# Patient Record
Sex: FEMALE | Race: WHITE | Employment: FULL TIME | URBAN - METROPOLITAN AREA
[De-identification: names, ages, dates, MRNs, and addresses within clinical notes are randomized per-mention and may not be internally consistent; named-entity substitution may affect disease eponyms.]

---

## 2018-06-13 ENCOUNTER — HOSPITAL ENCOUNTER (OUTPATIENT)
Dept: GENERAL RADIOLOGY | Age: 48
Discharge: HOME | End: 2018-06-15
Payer: COMMERCIAL

## 2018-06-13 ENCOUNTER — HOSPITAL ENCOUNTER (OUTPATIENT)
Age: 48
Discharge: HOME | End: 2018-06-15
Payer: COMMERCIAL

## 2018-06-13 ENCOUNTER — OFFICE VISIT (OUTPATIENT)
Dept: OBGYN | Age: 48
End: 2018-06-13
Payer: COMMERCIAL

## 2018-06-13 VITALS
SYSTOLIC BLOOD PRESSURE: 139 MMHG | BODY MASS INDEX: 36.68 KG/M2 | TEMPERATURE: 98.5 F | HEIGHT: 68 IN | RESPIRATION RATE: 14 BRPM | HEART RATE: 80 BPM | DIASTOLIC BLOOD PRESSURE: 65 MMHG | WEIGHT: 242 LBS

## 2018-06-13 DIAGNOSIS — Z12.31 ENCOUNTER FOR SCREENING MAMMOGRAM FOR BREAST CANCER: ICD-10-CM

## 2018-06-13 DIAGNOSIS — Z01.419 ENCNTR FOR GYN EXAM (GENERAL) (ROUTINE) W/O ABN FINDINGS: ICD-10-CM

## 2018-06-13 DIAGNOSIS — Z01.419 ENCNTR FOR GYN EXAM (GENERAL) (ROUTINE) W/O ABN FINDINGS: Primary | ICD-10-CM

## 2018-06-13 DIAGNOSIS — Z12.4 PAP SMEAR FOR CERVICAL CANCER SCREENING: ICD-10-CM

## 2018-06-13 PROCEDURE — 88175 CYTOPATH C/V AUTO FLUID REDO: CPT

## 2018-06-13 PROCEDURE — 99203 OFFICE O/P NEW LOW 30 MIN: CPT | Performed by: OBSTETRICS & GYNECOLOGY

## 2018-06-13 PROCEDURE — 99386 PREV VISIT NEW AGE 40-64: CPT | Performed by: OBSTETRICS & GYNECOLOGY

## 2018-06-13 PROCEDURE — 87624 HPV HI-RISK TYP POOLED RSLT: CPT

## 2018-06-13 PROCEDURE — 77063 BREAST TOMOSYNTHESIS BI: CPT

## 2018-06-13 PROCEDURE — P3000 SCREEN PAP BY TECH W MD SUPV: HCPCS | Performed by: OBSTETRICS & GYNECOLOGY

## 2018-06-20 LAB
CORRESPONDING PAP CASE #: NORMAL
HPV, HIGH RISK: NEGATIVE

## 2019-01-03 ENCOUNTER — HOSPITAL ENCOUNTER (OUTPATIENT)
Dept: GENERAL RADIOLOGY | Age: 49
Discharge: HOME | End: 2019-01-05
Payer: COMMERCIAL

## 2019-01-03 DIAGNOSIS — N63.0 LUMP OR MASS IN BREAST: ICD-10-CM

## 2019-01-03 DIAGNOSIS — N64.4 BREAST PAIN: ICD-10-CM

## 2019-01-03 PROCEDURE — G0279 TOMOSYNTHESIS, MAMMO: HCPCS

## 2019-01-03 PROCEDURE — 76642 ULTRASOUND BREAST LIMITED: CPT

## 2019-09-26 ENCOUNTER — OFFICE VISIT (OUTPATIENT)
Dept: PRIMARY CARE CLINIC | Age: 49
End: 2019-09-26
Payer: COMMERCIAL

## 2019-09-26 VITALS
DIASTOLIC BLOOD PRESSURE: 64 MMHG | RESPIRATION RATE: 16 BRPM | HEART RATE: 72 BPM | HEIGHT: 69 IN | SYSTOLIC BLOOD PRESSURE: 120 MMHG | BODY MASS INDEX: 35.4 KG/M2 | OXYGEN SATURATION: 98 % | WEIGHT: 239 LBS

## 2019-09-26 DIAGNOSIS — B00.1 COLD SORE: Primary | ICD-10-CM

## 2019-09-26 PROCEDURE — 99213 OFFICE O/P EST LOW 20 MIN: CPT | Performed by: NURSE PRACTITIONER

## 2019-09-26 RX ORDER — ACYCLOVIR 400 MG/1
400 TABLET ORAL 3 TIMES DAILY
Qty: 15 TABLET | Refills: 1 | Status: SHIPPED | OUTPATIENT
Start: 2019-09-26 | End: 2019-10-01

## 2019-09-26 ASSESSMENT — PATIENT HEALTH QUESTIONNAIRE - PHQ9
SUM OF ALL RESPONSES TO PHQ9 QUESTIONS 1 & 2: 0
1. LITTLE INTEREST OR PLEASURE IN DOING THINGS: 0
SUM OF ALL RESPONSES TO PHQ QUESTIONS 1-9: 0
SUM OF ALL RESPONSES TO PHQ QUESTIONS 1-9: 0
2. FEELING DOWN, DEPRESSED OR HOPELESS: 0

## 2019-09-26 ASSESSMENT — ENCOUNTER SYMPTOMS
VOMITING: 0
SINUS PAIN: 0
SHORTNESS OF BREATH: 0
DIARRHEA: 0
CONSTIPATION: 0
WHEEZING: 0
NAUSEA: 0
COUGH: 0

## 2019-11-15 ENCOUNTER — HOSPITAL ENCOUNTER (OUTPATIENT)
Dept: ULTRASOUND IMAGING | Age: 49
Discharge: HOME | End: 2019-11-15
Payer: COMMERCIAL

## 2019-11-15 DIAGNOSIS — N92.1 MENORRHAGIA WITH IRREGULAR CYCLE: ICD-10-CM

## 2019-11-15 PROCEDURE — 76830 TRANSVAGINAL US NON-OB: CPT

## 2019-11-26 ENCOUNTER — OFFICE VISIT (OUTPATIENT)
Dept: OBGYN | Age: 49
End: 2019-11-26
Payer: COMMERCIAL

## 2019-11-26 ENCOUNTER — HOSPITAL ENCOUNTER (OUTPATIENT)
Age: 49
Discharge: HOME | End: 2019-11-28
Payer: COMMERCIAL

## 2019-11-26 ENCOUNTER — HOSPITAL ENCOUNTER (OUTPATIENT)
Age: 49
Discharge: HOME | End: 2019-11-26
Payer: COMMERCIAL

## 2019-11-26 VITALS
DIASTOLIC BLOOD PRESSURE: 72 MMHG | BODY MASS INDEX: 33.95 KG/M2 | HEIGHT: 68 IN | WEIGHT: 224 LBS | SYSTOLIC BLOOD PRESSURE: 127 MMHG | HEART RATE: 79 BPM

## 2019-11-26 DIAGNOSIS — N93.9 ABNORMAL UTERINE BLEEDING: Primary | ICD-10-CM

## 2019-11-26 DIAGNOSIS — N93.9 ABNORMAL UTERINE BLEEDING: ICD-10-CM

## 2019-11-26 DIAGNOSIS — R93.89 ENDOMETRIAL THICKENING ON ULTRASOUND: ICD-10-CM

## 2019-11-26 LAB
CONTROL: NORMAL
HCT VFR BLD CALC: 37.5 % (ref 34–48)
HEMOGLOBIN: 11.7 G/DL (ref 11.5–15.5)
MCH RBC QN AUTO: 28.5 PG (ref 26–35)
MCHC RBC AUTO-ENTMCNC: 31.2 % (ref 32–34.5)
MCV RBC AUTO: 91.2 FL (ref 80–99.9)
PDW BLD-RTO: 13.2 FL (ref 11.5–15)
PLATELET # BLD: 381 E9/L (ref 130–450)
PMV BLD AUTO: 10.6 FL (ref 7–12)
PREGNANCY TEST URINE, POC: NEGATIVE
PROLACTIN: 7.03 NG/ML
RBC # BLD: 4.11 E12/L (ref 3.5–5.5)
TSH SERPL DL<=0.05 MIU/L-ACNC: 0.87 UIU/ML (ref 0.27–4.2)
WBC # BLD: 4.9 E9/L (ref 4.5–11.5)

## 2019-11-26 PROCEDURE — 36415 COLL VENOUS BLD VENIPUNCTURE: CPT

## 2019-11-26 PROCEDURE — 84146 ASSAY OF PROLACTIN: CPT

## 2019-11-26 PROCEDURE — 81025 URINE PREGNANCY TEST: CPT | Performed by: OBSTETRICS & GYNECOLOGY

## 2019-11-26 PROCEDURE — 84443 ASSAY THYROID STIM HORMONE: CPT

## 2019-11-26 PROCEDURE — 99213 OFFICE O/P EST LOW 20 MIN: CPT | Performed by: OBSTETRICS & GYNECOLOGY

## 2019-11-26 PROCEDURE — 58100 BIOPSY OF UTERUS LINING: CPT | Performed by: OBSTETRICS & GYNECOLOGY

## 2019-11-26 PROCEDURE — 85027 COMPLETE CBC AUTOMATED: CPT

## 2019-11-26 PROCEDURE — 88305 TISSUE EXAM BY PATHOLOGIST: CPT

## 2019-12-03 ENCOUNTER — OFFICE VISIT (OUTPATIENT)
Dept: OBGYN | Age: 49
End: 2019-12-03
Payer: COMMERCIAL

## 2019-12-03 VITALS — HEART RATE: 83 BPM | DIASTOLIC BLOOD PRESSURE: 57 MMHG | SYSTOLIC BLOOD PRESSURE: 128 MMHG

## 2019-12-03 DIAGNOSIS — N93.9 ABNORMAL UTERINE BLEEDING: Primary | ICD-10-CM

## 2019-12-03 DIAGNOSIS — N63.10 LUMP OF RIGHT BREAST: ICD-10-CM

## 2019-12-03 PROCEDURE — 99213 OFFICE O/P EST LOW 20 MIN: CPT | Performed by: OBSTETRICS & GYNECOLOGY

## 2019-12-03 PROCEDURE — 99212 OFFICE O/P EST SF 10 MIN: CPT | Performed by: OBSTETRICS & GYNECOLOGY

## 2019-12-03 RX ORDER — FERROUS SULFATE 325(65) MG
325 TABLET ORAL
COMMUNITY
End: 2020-10-01

## 2019-12-03 RX ORDER — ASCORBIC ACID 500 MG
500 TABLET ORAL DAILY
COMMUNITY

## 2019-12-05 DIAGNOSIS — N63.0 BREAST LUMP: Primary | ICD-10-CM

## 2019-12-05 DIAGNOSIS — N64.4 BREAST PAIN: ICD-10-CM

## 2019-12-06 ASSESSMENT — ENCOUNTER SYMPTOMS
ABDOMINAL PAIN: 0
WHEEZING: 0
SORE THROAT: 0
CHEST TIGHTNESS: 0
SINUS PRESSURE: 0
SINUS PAIN: 0
TROUBLE SWALLOWING: 0
SHORTNESS OF BREATH: 0
DIARRHEA: 0
CONSTIPATION: 0
EYE DISCHARGE: 0
VOICE CHANGE: 0
EYE ITCHING: 0
COUGH: 0
ABDOMINAL DISTENTION: 0
BLOOD IN STOOL: 0
BACK PAIN: 0
NAUSEA: 0
VOMITING: 0
CHOKING: 0
RHINORRHEA: 0

## 2019-12-10 ENCOUNTER — OFFICE VISIT (OUTPATIENT)
Dept: BREAST CENTER | Age: 49
End: 2019-12-10
Payer: COMMERCIAL

## 2019-12-10 ENCOUNTER — HOSPITAL ENCOUNTER (OUTPATIENT)
Dept: GENERAL RADIOLOGY | Age: 49
Discharge: HOME | End: 2019-12-12
Payer: COMMERCIAL

## 2019-12-10 ENCOUNTER — HOSPITAL ENCOUNTER (OUTPATIENT)
Age: 49
Discharge: HOME | End: 2019-12-12
Payer: COMMERCIAL

## 2019-12-10 VITALS
BODY MASS INDEX: 33.65 KG/M2 | HEART RATE: 73 BPM | DIASTOLIC BLOOD PRESSURE: 70 MMHG | RESPIRATION RATE: 17 BRPM | SYSTOLIC BLOOD PRESSURE: 110 MMHG | HEIGHT: 68 IN | OXYGEN SATURATION: 98 % | TEMPERATURE: 98.7 F | WEIGHT: 222 LBS

## 2019-12-10 DIAGNOSIS — N63.20 LUMP OF LEFT BREAST: ICD-10-CM

## 2019-12-10 DIAGNOSIS — N63.0 BREAST LUMP: ICD-10-CM

## 2019-12-10 DIAGNOSIS — R92.2 DENSE BREAST TISSUE ON MAMMOGRAM: ICD-10-CM

## 2019-12-10 DIAGNOSIS — Z12.39 BREAST CANCER SCREENING, HIGH RISK PATIENT: ICD-10-CM

## 2019-12-10 DIAGNOSIS — R92.2 DENSE BREAST: ICD-10-CM

## 2019-12-10 DIAGNOSIS — Z80.3 FAMILY HISTORY OF BREAST CANCER IN FIRST DEGREE RELATIVE: ICD-10-CM

## 2019-12-10 DIAGNOSIS — N64.4 BREAST PAIN: ICD-10-CM

## 2019-12-10 DIAGNOSIS — N64.4 PAIN IN BREAST: ICD-10-CM

## 2019-12-10 DIAGNOSIS — N63.15 BREAST LUMP ON RIGHT SIDE AT 9 O'CLOCK POSITION: ICD-10-CM

## 2019-12-10 DIAGNOSIS — N64.4 PAIN IN BREAST: Primary | ICD-10-CM

## 2019-12-10 LAB
BUN BLDV-MCNC: 9 MG/DL (ref 6–20)
CREAT SERPL-MCNC: 0.7 MG/DL (ref 0.5–1)
GFR AFRICAN AMERICAN: >60
GFR NON-AFRICAN AMERICAN: >60 ML/MIN/1.73

## 2019-12-10 PROCEDURE — 82565 ASSAY OF CREATININE: CPT

## 2019-12-10 PROCEDURE — 84520 ASSAY OF UREA NITROGEN: CPT

## 2019-12-10 PROCEDURE — 36415 COLL VENOUS BLD VENIPUNCTURE: CPT | Performed by: NURSE PRACTITIONER

## 2019-12-10 PROCEDURE — 99203 OFFICE O/P NEW LOW 30 MIN: CPT | Performed by: NURSE PRACTITIONER

## 2019-12-10 PROCEDURE — G0279 TOMOSYNTHESIS, MAMMO: HCPCS

## 2019-12-10 PROCEDURE — 76642 ULTRASOUND BREAST LIMITED: CPT

## 2019-12-10 PROCEDURE — 99204 OFFICE O/P NEW MOD 45 MIN: CPT | Performed by: NURSE PRACTITIONER

## 2019-12-27 ENCOUNTER — HOSPITAL ENCOUNTER (OUTPATIENT)
Dept: GENERAL RADIOLOGY | Age: 49
Discharge: HOME | End: 2019-12-29
Payer: COMMERCIAL

## 2019-12-27 DIAGNOSIS — N63.20 LUMP OF LEFT BREAST: ICD-10-CM

## 2019-12-27 PROCEDURE — 76642 ULTRASOUND BREAST LIMITED: CPT

## 2020-01-08 ENCOUNTER — TELEPHONE (OUTPATIENT)
Dept: ADMINISTRATIVE | Age: 50
End: 2020-01-08

## 2020-01-10 ENCOUNTER — TELEPHONE (OUTPATIENT)
Dept: BREAST CENTER | Age: 50
End: 2020-01-10

## 2020-01-10 NOTE — TELEPHONE ENCOUNTER
Authorization obtained for breast MRI 66514 - Approval # [id number] valid until 2/24/20 -- [facility] [place] [employer]

## 2020-01-13 ENCOUNTER — PROCEDURE VISIT (OUTPATIENT)
Dept: OBGYN | Age: 50
End: 2020-01-13
Payer: COMMERCIAL

## 2020-01-13 VITALS
TEMPERATURE: 98.2 F | BODY MASS INDEX: 32.58 KG/M2 | SYSTOLIC BLOOD PRESSURE: 127 MMHG | RESPIRATION RATE: 16 BRPM | HEIGHT: 68 IN | WEIGHT: 215 LBS | DIASTOLIC BLOOD PRESSURE: 75 MMHG | HEART RATE: 73 BPM

## 2020-01-13 LAB
CONTROL: NORMAL
PREGNANCY TEST URINE, POC: NORMAL

## 2020-01-13 PROCEDURE — 58300 INSERT INTRAUTERINE DEVICE: CPT | Performed by: OBSTETRICS & GYNECOLOGY

## 2020-01-13 PROCEDURE — 81025 URINE PREGNANCY TEST: CPT | Performed by: OBSTETRICS & GYNECOLOGY

## 2020-01-13 PROCEDURE — 99213 OFFICE O/P EST LOW 20 MIN: CPT | Performed by: OBSTETRICS & GYNECOLOGY

## 2020-01-13 NOTE — PATIENT INSTRUCTIONS
RETURN IN 4 WEEKS FOR IUD CHECK. CALL FOR EXCESSIVE PAIN  FEVER OR BLEEDING. DISCHARGED BY DR Екатерина Gilmore.

## 2020-01-23 ENCOUNTER — TELEPHONE (OUTPATIENT)
Dept: BREAST CENTER | Age: 50
End: 2020-01-23

## 2020-01-23 ENCOUNTER — HOSPITAL ENCOUNTER (OUTPATIENT)
Dept: MRI IMAGING | Age: 50
Discharge: HOME | End: 2020-01-25
Payer: COMMERCIAL

## 2020-01-23 PROCEDURE — 77049 MRI BREAST C-+ W/CAD BI: CPT

## 2020-01-23 PROCEDURE — 6360000004 HC RX CONTRAST MEDICATION: Performed by: RADIOLOGY

## 2020-01-23 PROCEDURE — A9585 GADOBUTROL INJECTION: HCPCS | Performed by: RADIOLOGY

## 2020-01-23 RX ADMIN — GADOBUTROL 10 ML: 604.72 INJECTION INTRAVENOUS at 08:18

## 2020-01-27 ASSESSMENT — ENCOUNTER SYMPTOMS
SINUS PRESSURE: 0
SHORTNESS OF BREATH: 0
NAUSEA: 0
EYE DISCHARGE: 0
VOICE CHANGE: 0
CONSTIPATION: 0
CHOKING: 0
SINUS PAIN: 0
RHINORRHEA: 0
WHEEZING: 0
ABDOMINAL DISTENTION: 0
BLOOD IN STOOL: 0
ABDOMINAL PAIN: 0
DIARRHEA: 0
BACK PAIN: 0
SORE THROAT: 0
CHEST TIGHTNESS: 0
COUGH: 0
EYE ITCHING: 0
VOMITING: 0
TROUBLE SWALLOWING: 0

## 2020-01-27 NOTE — PROGRESS NOTES
Subjective:      Patient ID: Merari Lyman is a 52 y.o. female. HPI     History and Physical    Patient's Name/Date of Birth: Merari Lyman / 1970    Date: February 3, 2020    Merari Lyman presents for evaluation of a right breast skin thickening. Recent MRI recommending possible skin biopsy. PCP: CAMERON Lovett NP. Gynecologist:  Dr. Xiao Mehta    The patient notes a change in her right axilla. Patient states the right breast lump has been painful x 1 year. The patient has not noted a change in BSE since presentation. Patient does routinely do self breast exams. Patient reports pruritic skin everywhere on her body with exceptionally dry skin on her hands. She develops lesions on her lower breast which she scratches and has intermittent episodes with inflammation. Patient denies nipple discharge (only with squeezing). Patient denies  previous breast biopsy. Patient denies a personal history of breast cancer. Breast cancer risk factors include age, gender, family history breast cancer; Has 13 siblings; FH not fully understood.  -[de-identified] Sister with breast cancer late 42's; currently 63's  -Paternal grandmother  pre-menopausal breast cancer-age unknown.  -[de-identified] Sister with Hodgkin's currently; mid 63's  -Brother  48 lung cancer (smoker). -Brother  42's head and neck cancer  -Remainder FH unknown  -Maternal history largely unknown due to mom was a Foster child. Ashkenazi Anabaptist Ancestry: No.    OBSTETRIC RELATED HISTORY:  Age of menarche was 15. LMP: patient thinks she in perimenopause. Cycles every 2 1/2 weeks, ongoing x 3-4 months. Patient denies hormonal therapy. Patient is . Age of first live birth was 23. Patient did breast feed.     CANCER SURVEILLANCE HISTORY:  Mammograms: Yes / 2019  Breast MRI's: No   Breast Biopsies: No   Colonoscopy: No  GI Polyps: Not Applicable   EGD: No   Pelvic Exam: Yes /   Pap Smear: Yes / ~2018  Dermatology: No   Lung screening: no    Estimated body mass index is 32.69 kg/m² as calculated from the following:    Height as of 1/13/20: 5' 8\" (1.727 m). Weight as of 1/13/20: 215 lb (97.5 kg). Bra Size: 44 DD    Because violence is so common, we ask all our patients: are you in a relationship or do you live with a person who threatens, hurts, or controls you:  Patient denies    Patient drinks little caffeinated beverages. Patient does not smoke cigarettes. Patient does not use recreational drugs. 12/10/19 - Bilateral diagnostic mammogram & Right breast ultrasound - [facility]:  TISSUE DENSITY:   The breasts are heterogeneously dense (Type 3 density).             MAMMOGRAM FINDINGS:   Finding 1:  No suspicious masses, areas of suspicious architectural distortion, suspicious calcifications, or additional suspicious findings are identified. There are no significant changes from the prior study.       ULTRASOUND FINDINGS:       Finding 1:  Sonography was performed in the right breast using a radial and anti-radial approach.       Finding 2:  No sonographic evidence of suspicious solid or cystic mass lesions. No focal areas of suspicious atypical echogenicity.       IMPRESSION:   Finding 1:  No mammographic evidence of malignancy.       Finding 2:  There is no sonographic evidence of malignancy.       Screening mammogram in 1 year is recommended.       =======================================   BI-RADS Category 1:  Negative   =======================================       RISK ASSESSMENT:   During this patient's visit, information obtained was used to generate a lifetime risk assessment using the Tyrer-Cuzick model (also called the FRANCESCA [International Breast Cancer Intervention Study] Breast Cancer Risk Evaluation Tool).        - LIFETIME RISK -   Patient has a Tyrer Cuzick score of 15.6%         January 23, 2020 - Breast MRI - [facility]:     FINDINGS:   There is heterogeneous fibroglandular tissue with mild background   parenchymal enhancement. A 1.3 cm area of skin enhancement is   identified in the lower outer quadrant of the right breast (image 81   the axial T1 postcontrast series). It is approximately 4 cm inferior   and 1.5 cm lateral to the right nipple. No suspicious mass or non-mass   enhancement seen on the left. There is no lymphadenopathy. Bilateral   nipple areolar complexes are normal. Visualized portions of the chest   and abdomen are unremarkable.           Impression       1. Area of skin enhancement in the lower outer quadrant of the right   breast is suspicious.        2. No evidence of neoplasm on the left.       RECOMMENDATION:   Surgical consultation is advised with consideration for punch biopsy   of the skin.       BIRADS 4: Suspicious abnormality         All provided imaging reviewed with patient. Subtle anterior right breast enhancement. Past Surgical History:   Procedure Laterality Date    ANKLE FRACTURE SURGERY  2006    TUBAL LIGATION         Current Outpatient Medications   Medication Sig Dispense Refill    MIRENA, 52 MG, IUD 52 mg 52 mg      ferrous sulfate 325 (65 Fe) MG tablet Take 325 mg by mouth daily (with breakfast)      vitamin C (ASCORBIC ACID) 500 MG tablet Take 500 mg by mouth daily       No current facility-administered medications for this visit.         No Known Allergies    Family History   Problem Relation Age of Onset    Cancer Father 72        multiple myleoma    High Blood Pressure Father     Cancer Paternal Grandmother         breast    Other Other 54        half sister - (same father) - breast    Cancer Brother         unknown type - jaw    Lung Cancer Brother 54    Other Other 58        non hodgkins lymphoma    Cancer Paternal Aunt         unknown type    Cancer Paternal Uncle         unknown type - possible kidney    Cancer Sister 48        breast    Cancer Sister 64        lymphoma (not sure of type)       Social History     Socioeconomic Negative for abdominal distention, abdominal pain, blood in stool, constipation, diarrhea, nausea and vomiting. Endocrine: Negative for cold intolerance and heat intolerance. Genitourinary: Negative for difficulty urinating, dysuria, frequency and hematuria. Musculoskeletal: Negative for arthralgias, back pain, gait problem, joint swelling, myalgias, neck pain and neck stiffness. Allergic/Immunologic: Negative for environmental allergies and food allergies. Neurological: Negative for dizziness, seizures, syncope, speech difficulty, weakness, light-headedness and headaches. Hematological: Negative for adenopathy. Does not bruise/bleed easily. Psychiatric/Behavioral: Negative for agitation, confusion and decreased concentration. The patient is not nervous/anxious. Objective:   Physical Exam  Vitals signs and nursing note reviewed. Constitutional:       General: She is not in acute distress. Appearance: Normal appearance. She is well-developed. She is not diaphoretic. HENT:      Head: Normocephalic and atraumatic. Mouth/Throat:      Pharynx: No oropharyngeal exudate. Eyes:      General: No scleral icterus. Right eye: No discharge. Left eye: No discharge. Conjunctiva/sclera: Conjunctivae normal.   Neck:      Musculoskeletal: Normal range of motion and neck supple. Thyroid: No thyromegaly. Vascular: No JVD. Trachea: No tracheal deviation. Cardiovascular:      Rate and Rhythm: Normal rate and regular rhythm. Heart sounds: No murmur. No friction rub. No gallop. Pulmonary:      Effort: Pulmonary effort is normal. No respiratory distress or retractions. Breath sounds: Normal breath sounds. No stridor. No wheezing or rales. Chest:      Chest wall: No mass, lacerations, deformity, swelling, tenderness or edema. Breasts: Breasts are symmetrical.         Right: Skin change present.  No inverted nipple, mass, nipple discharge or tenderness. Left: No inverted nipple, mass, nipple discharge, skin change or tenderness. Comments: Right nipple and overall skin of right breast appear normal except for excoriations. Abdominal:      General: There is no distension. Palpations: Abdomen is soft. Tenderness: There is no abdominal tenderness. There is no guarding or rebound. Musculoskeletal: Normal range of motion. General: No tenderness or deformity. Right shoulder: Normal.      Left shoulder: Normal.   Lymphadenopathy:      Cervical: No cervical adenopathy. Right cervical: No superficial, deep or posterior cervical adenopathy. Left cervical: No superficial, deep or posterior cervical adenopathy. Upper Body:      Right upper body: No pectoral adenopathy. Left upper body: No pectoral adenopathy. Skin:     General: Skin is warm and dry. Coloration: Skin is not pale. Findings: No erythema or rash. Neurological:      Mental Status: She is alert and oriented to person, place, and time. Coordination: Coordination normal.   Psychiatric:         Behavior: Behavior normal.         Thought Content: Thought content normal.         Judgment: Judgment normal.         Assessment:     52 y.o. extremely pleasant female who's risk for breast cancer include gender and only partially understood family history. What is known of her family history includes:  -[de-identified] Sister with breast cancer late 42's; currently 63's  -Paternal grandmother  pre-menopausal breast cancer-age unknown.  -[de-identified] Sister with Hodgkin's currently; mid 63's    She presents with c/o discomfort and fullness in her right axilla. A bilateral diagnostic mammogram and bilateral breast US 2019 @ [address] [place] [address] Dr Espino        MAMMOGRAM FINDINGS:   Finding 1:  No suspicious masses, areas of suspicious architectural distortion, suspicious calcifications, or additional suspicious findings are identified.  There are no significant         MAMMOGRAM FINDINGS:   Finding 1:  No suspicious masses, areas of suspicious architectural distortion, suspicious calcifications, or additional suspicious findings are identified. There are no significant changes from the prior study.       ULTRASOUND FINDINGS:       Finding 1:  Sonography was performed in the right breast using a radial and anti-radial approach.       Finding 2:  No sonographic evidence of suspicious solid or cystic mass lesions. No focal areas of suspicious atypical echogenicity.       IMPRESSION:   Finding 1:  No mammographic evidence of malignancy.       Finding 2:  There is no sonographic evidence of malignancy.       Screening mammogram in 1 year is recommended.       =======================================   BI-RADS Category 1:  Negative   =======================================       RISK ASSESSMENT:   During this patient's visit, information obtained was used to generate a lifetime risk assessment using the Tyrer-Cuzick model (also called the FRANCESCA [International Breast Cancer Intervention Study] Breast Cancer Risk Evaluation Tool).     - LIFETIME RISK -   Patient has a Tyrer Cuzick score of 15.6%     -Abnormal uterine bleeding:  Post biopsy noting proliferative endometrium. Mirena placement scheduled per Gynecology. During pt's prior visit a Tyrer-Cuzick Risk Evaluation was personally calculated. Pt has a 23.9%  lifetime risk (to age 80) of developing breast cancer. However, of note is her estrangement from most of her paternal family as well as her mother was in [facility] and Maternal history not fully understood. January 23, 2020 - Breast MRI - [facility]:     FINDINGS:   There is heterogeneous fibroglandular tissue with mild background   parenchymal enhancement. A 1.3 cm area of skin enhancement is   identified in the lower outer quadrant of the right breast (image 81   the axial T1 postcontrast series).  It is approximately 4 cm inferior   and 1.5 cm lateral to the

## 2020-02-03 ENCOUNTER — OFFICE VISIT (OUTPATIENT)
Dept: BREAST CENTER | Age: 50
End: 2020-02-03
Payer: COMMERCIAL

## 2020-02-03 VITALS
SYSTOLIC BLOOD PRESSURE: 118 MMHG | BODY MASS INDEX: 32.28 KG/M2 | HEART RATE: 65 BPM | WEIGHT: 213 LBS | DIASTOLIC BLOOD PRESSURE: 60 MMHG | HEIGHT: 68 IN | TEMPERATURE: 98 F | OXYGEN SATURATION: 98 % | RESPIRATION RATE: 16 BRPM

## 2020-02-03 PROBLEM — R92.8 ABNORMAL MRI, BREAST: Status: ACTIVE | Noted: 2020-02-03

## 2020-02-03 PROCEDURE — 99214 OFFICE O/P EST MOD 30 MIN: CPT | Performed by: SURGERY

## 2020-02-03 PROCEDURE — 99213 OFFICE O/P EST LOW 20 MIN: CPT | Performed by: SURGERY

## 2020-02-03 NOTE — LETTER
All imaging reviewed with patient. Clinical exam is unremarkable. Changes at time at MRI of MRI were likely due to itching and scratching and development of excoriated skin. Due to family history and dense breast tissue patient is a candidate for yearly breast imaging. She will be due again in January 2021. Plan:  Continue monthly breast/chest wall self examination; detailed instructions reviewed today. Bring any changes to your physician's attention. Continue healthy diet and exercise routinely as tolerated. Avoid alcohol. Limit caffeine intake. Weight reduction ongoing  Wear a good support bra at all times. Continue follow up with Primary Care/Gynecolgy    If you have questions, please do not hesitate to call me. I look forward to following [facility] Days along with you.     Sincerely,  Gibran Mcdonnell MD

## 2020-02-03 NOTE — COMMUNICATION BODY
changes from the prior study.       ULTRASOUND FINDINGS:       Finding 1:  Sonography was performed in the right breast using a radial and anti-radial approach.       Finding 2:  No sonographic evidence of suspicious solid or cystic mass lesions. No focal areas of suspicious atypical echogenicity.       IMPRESSION:   Finding 1:  No mammographic evidence of malignancy.       Finding 2:  There is no sonographic evidence of malignancy.       Screening mammogram in 1 year is recommended.       =======================================   BI-RADS Category 1:  Negative       12/10/19 - Bilateral diagnostic mammogram & Right breast ultrasound - [facility]:  TISSUE DENSITY:   The breasts are heterogeneously dense (Type 3 density).             MAMMOGRAM FINDINGS:   Finding 1:  No suspicious masses, areas of suspicious architectural distortion, suspicious calcifications, or additional suspicious findings are identified. There are no significant changes from the prior study.       ULTRASOUND FINDINGS:       Finding 1:  Sonography was performed in the right breast using a radial and anti-radial approach.       Finding 2:  No sonographic evidence of suspicious solid or cystic mass lesions. No focal areas of suspicious atypical echogenicity.       IMPRESSION:   Finding 1:  No mammographic evidence of malignancy.       Finding 2:  There is no sonographic evidence of malignancy.       Screening mammogram in 1 year is recommended.       =======================================   BI-RADS Category 1:  Negative   =======================================       RISK ASSESSMENT:   During this patient's visit, information obtained was used to generate a lifetime risk assessment using the Tyrer-Cuzick model (also called the FRANCESCA [International Breast Cancer Intervention Study] Breast Cancer Risk Evaluation Tool).        - LIFETIME RISK -   Patient has a Tyrer Cuzick score of 15.6%     -Abnormal uterine bleeding:  Post biopsy noting proliferative Care/Gynecolgy

## 2020-02-11 ENCOUNTER — OFFICE VISIT (OUTPATIENT)
Dept: OBGYN | Age: 50
End: 2020-02-11
Payer: COMMERCIAL

## 2020-02-11 ENCOUNTER — HOSPITAL ENCOUNTER (OUTPATIENT)
Age: 50
Discharge: HOME | End: 2020-02-13
Payer: COMMERCIAL

## 2020-02-11 VITALS
SYSTOLIC BLOOD PRESSURE: 136 MMHG | DIASTOLIC BLOOD PRESSURE: 71 MMHG | BODY MASS INDEX: 31.93 KG/M2 | HEART RATE: 87 BPM | WEIGHT: 210 LBS

## 2020-02-11 LAB
HCT VFR BLD CALC: 41.6 % (ref 34–48)
HEMOGLOBIN: 12.5 G/DL (ref 11.5–15.5)
MCH RBC QN AUTO: 27.1 PG (ref 26–35)
MCHC RBC AUTO-ENTMCNC: 30 % (ref 32–34.5)
MCV RBC AUTO: 90 FL (ref 80–99.9)
PDW BLD-RTO: 15 FL (ref 11.5–15)
PLATELET # BLD: 317 E9/L (ref 130–450)
PMV BLD AUTO: 10.8 FL (ref 7–12)
RBC # BLD: 4.62 E12/L (ref 3.5–5.5)
WBC # BLD: 6.4 E9/L (ref 4.5–11.5)

## 2020-02-11 PROCEDURE — 99213 OFFICE O/P EST LOW 20 MIN: CPT | Performed by: OBSTETRICS & GYNECOLOGY

## 2020-02-11 PROCEDURE — 85027 COMPLETE CBC AUTOMATED: CPT

## 2020-02-11 PROCEDURE — 36415 COLL VENOUS BLD VENIPUNCTURE: CPT

## 2020-02-11 NOTE — PROGRESS NOTES
Ildefonso Colon     Patient presents for IUD check. Patient does have some bleeding daily. States it is not as bad as her regular periods. Reviewed that it may take 6 months to see improvement with the Mirena. Patient still with significant fatigue. She has restarted her iron. Discussed taking miralax with the iron supplements. Will order CBC. If she is not anemic, recommend following up with PCP. History reviewed. No pertinent past medical history. Past Surgical History:   Procedure Laterality Date    ANKLE FRACTURE SURGERY  2006    TUBAL LIGATION          Family History   Problem Relation Age of Onset    Cancer Father 72        multiple myleoma    High Blood Pressure Father     Cancer Paternal Grandmother         breast    Other Other 54        half sister - (same father) - breast    Cancer Brother         unknown type - jaw    Lung Cancer Brother 54    Other Other 58        non hodgkins lymphoma    Cancer Paternal Aunt         unknown type    Cancer Paternal Uncle         unknown type - possible kidney    Cancer Sister 48        breast    Cancer Sister 64        lymphoma (not sure of type)        Social History     Tobacco History     Smoking Status  Never Smoker    Smokeless Tobacco Use  Never Used          Alcohol History     Alcohol Use Status  No          Drug Use     Drug Use Status  No          Sexual Activity     Sexually Active  Yes Partners  Male                  Current Outpatient Medications:     ferrous sulfate 325 (65 Fe) MG tablet, Take 325 mg by mouth daily (with breakfast), Disp: , Rfl:     vitamin C (ASCORBIC ACID) 500 MG tablet, Take 500 mg by mouth daily, Disp: , Rfl:     MIRENA, 52 MG, IUD 52 mg, 52 mg, Disp: , Rfl:      No Known Allergies     Vitals:    02/11/20 0938   BP: 136/71   Pulse: 87        Physical Exam:  General: pleasant, alert     Pelvic exam: normal vulva,  Vagina, cervix. Dark blood in vault.  Strings visualized from external os     Garry Gloria was seen today for other. Diagnoses and all orders for this visit:    Abnormal uterine bleeding  -     CBC; Future    Fatigue, unspecified type    Iron deficiency anemia, unspecified iron deficiency anemia type          Return as needed . Advised to call if bleeding worsens or does not improve. Will order pelvic ultrasound.      Gerald Patel MD

## 2020-02-12 ENCOUNTER — TELEPHONE (OUTPATIENT)
Dept: OBGYN | Age: 50
End: 2020-02-12

## 2020-02-12 NOTE — TELEPHONE ENCOUNTER
Telephone call     Notified patient of normal hemoglobin levels. Continue iron. Advised to see PCP due to fatigue.      Julia Cedillo

## 2020-05-21 ENCOUNTER — PROCEDURE VISIT (OUTPATIENT)
Dept: OBGYN | Age: 50
End: 2020-05-21
Payer: COMMERCIAL

## 2020-05-21 VITALS
DIASTOLIC BLOOD PRESSURE: 62 MMHG | BODY MASS INDEX: 33.45 KG/M2 | HEART RATE: 82 BPM | WEIGHT: 220 LBS | SYSTOLIC BLOOD PRESSURE: 121 MMHG

## 2020-05-21 PROCEDURE — 99214 OFFICE O/P EST MOD 30 MIN: CPT | Performed by: OBSTETRICS & GYNECOLOGY

## 2020-05-21 PROCEDURE — 58300 INSERT INTRAUTERINE DEVICE: CPT | Performed by: OBSTETRICS & GYNECOLOGY

## 2020-05-21 NOTE — PROGRESS NOTES
Patient alert and pleasant with no complaints  Here today for Mirena insertion. Consent form signed and scanned into chart  Urine for pregnancy obtained with negative results  Here today for Mirena. Instructed on the procedure of mirena insertion, voiced understanding and permit signed   Prepared for procedure. Time out done by  Prior to starting the procedure. Tolerated procedure. No bleeding noted after procedure, marleny pad applied. Discharge instructions have been discussed with the patient. Patient advised to call our office with any questions or concerns. Voiced understanding.

## 2020-08-18 ENCOUNTER — TELEPHONE (OUTPATIENT)
Dept: OBGYN | Age: 50
End: 2020-08-18

## 2020-08-18 NOTE — TELEPHONE ENCOUNTER
Pt left message on nurse line stating her Mirena fell out. States she wants to know what her alternative is since this has happened before. Mirena placement was on 5/21/2020.

## 2020-08-20 RX ORDER — MEDROXYPROGESTERONE ACETATE 10 MG/1
10 TABLET ORAL DAILY
Qty: 30 TABLET | Refills: 3 | Status: SHIPPED | OUTPATIENT
Start: 2020-08-20 | End: 2020-12-18

## 2020-08-20 NOTE — PROGRESS NOTES
Telephone call     Patient called and she expelled her second Mirena. Patient with heavy bleeding, clots. Discussed using other options including hormonal pill, nuvaring, or surgery with ablation or hysterectomy. Patient would like to try a pill but reluctant to use estrogen. Will try daily provera. Risks reviewed. Patient to follow up in 3 months.      Gabe Gilliland MD

## 2020-10-01 ENCOUNTER — OFFICE VISIT (OUTPATIENT)
Dept: FAMILY MEDICINE CLINIC | Age: 50
End: 2020-10-01
Payer: COMMERCIAL

## 2020-10-01 VITALS
RESPIRATION RATE: 18 BRPM | SYSTOLIC BLOOD PRESSURE: 118 MMHG | BODY MASS INDEX: 34.75 KG/M2 | DIASTOLIC BLOOD PRESSURE: 78 MMHG | TEMPERATURE: 96.7 F | WEIGHT: 229.3 LBS | OXYGEN SATURATION: 99 % | HEIGHT: 68 IN | HEART RATE: 84 BPM

## 2020-10-01 PROCEDURE — 99203 OFFICE O/P NEW LOW 30 MIN: CPT | Performed by: FAMILY MEDICINE

## 2020-10-01 RX ORDER — MULTIVIT-MIN/IRON/FOLIC ACID/K 18-600-40
CAPSULE ORAL
COMMUNITY

## 2020-10-01 RX ORDER — VITAMIN B COMPLEX
1 CAPSULE ORAL DAILY
COMMUNITY

## 2020-10-01 RX ORDER — MULTIVIT WITH MINERALS/LUTEIN
1000 TABLET ORAL DAILY
COMMUNITY

## 2020-10-01 RX ORDER — M-VIT,TX,IRON,MINS/CALC/FOLIC 27MG-0.4MG
1 TABLET ORAL DAILY
COMMUNITY

## 2020-10-01 ASSESSMENT — PATIENT HEALTH QUESTIONNAIRE - PHQ9
SUM OF ALL RESPONSES TO PHQ9 QUESTIONS 1 & 2: 0
SUM OF ALL RESPONSES TO PHQ QUESTIONS 1-9: 0
2. FEELING DOWN, DEPRESSED OR HOPELESS: 0
1. LITTLE INTEREST OR PLEASURE IN DOING THINGS: 0
SUM OF ALL RESPONSES TO PHQ QUESTIONS 1-9: 0

## 2020-10-01 NOTE — PROGRESS NOTES
sister, paternal grandmother. Lung cancer- brother- smoker. Father Multiple Myeloma. Brother with brain tumor. Sister with Lymphoma. Review of Systems    Review of Systems: All bolded are positive, all others are negative. General:  Fever, chills, diaphoresis, fatigue, malaise, night sweats, weight loss  Psychological:  Anxiety, disorientation, hallucinations. ENT:  Epistaxis, headaches, vertigo, visual changes. Cardiovascular:  Chest pain, irregular heartbeats, palpitations, paroxysmal nocturnal dyspnea. Respiratory:  Shortness of breath, coughing, sputum production, hemoptysis, wheezing, orthopnea. Gastrointestinal:  Nausea, vomiting, diarrhea, heartburn, constipation, abdominal pain, hematemesis, hematochezia, melena, acholic stools  Genito-Urinary:  Dysuria, urgency, frequency, hematuria  Musculoskeletal:  Joint pain, joint stiffness, joint swelling, muscle pain  Neurology:  Headache, focal neurological deficits, weakness, numbness, paresthesia  Derm:  Rashes, ulcers, excoriations, bruising  Extremities:  Decreased ROM, peripheral edema, mottling      Objective:  Vitals:    10/01/20 0816   BP: 118/78   Pulse: 84   Resp: 18   Temp: 96.7 °F (35.9 °C)   SpO2: 99%     Physical Exam  Constitutional:       General: She is not in acute distress. Appearance: She is well-developed. She is not diaphoretic. HENT:      Head: Normocephalic and atraumatic. Right Ear: External ear normal.      Left Ear: External ear normal.      Nose: Nose normal.      Mouth/Throat:      Pharynx: No oropharyngeal exudate. Eyes:      General:         Right eye: No discharge. Left eye: No discharge. Conjunctiva/sclera: Conjunctivae normal.      Pupils: Pupils are equal, round, and reactive to light. Neck:      Musculoskeletal: Normal range of motion and neck supple. Cardiovascular:      Rate and Rhythm: Normal rate and regular rhythm. Heart sounds: Normal heart sounds. No murmur. No friction rub.  No gallop. Pulmonary:      Effort: Pulmonary effort is normal. No respiratory distress. Breath sounds: Normal breath sounds. No wheezing or rales. Abdominal:      General: Bowel sounds are normal. There is no distension. Palpations: Abdomen is soft. Tenderness: There is no abdominal tenderness. There is no guarding or rebound. Musculoskeletal:      Comments: No tenderness upon palpation of medial right upper arm. There are no enlarged lymph nodes noted in right axilla. Lymphadenopathy:      Cervical: No cervical adenopathy. Neurological:      Mental Status: She is alert and oriented to person, place, and time. Psychiatric:         Behavior: Behavior normal.         Thought Content: Thought content normal.         Judgment: Judgment normal.         Results for orders placed or performed during the hospital encounter of 02/11/20   CBC   Result Value Ref Range    WBC 6.4 4.5 - 11.5 E9/L    RBC 4.62 3.50 - 5.50 E12/L    Hemoglobin 12.5 11.5 - 15.5 g/dL    Hematocrit 41.6 34.0 - 48.0 %    MCV 90.0 80.0 - 99.9 fL    MCH 27.1 26.0 - 35.0 pg    MCHC 30.0 (L) 32.0 - 34.5 %    RDW 15.0 11.5 - 15.0 fL    Platelets 968 753 - 966 E9/L    MPV 10.8 7.0 - 12.0 fL         Assessment and Plan:  Shawn Hunt was seen today for new patient. Diagnoses and all orders for this visit:    Colon cancer screening  -     Cologuard (For External Results Only); Future    Class 1 obesity due to excess calories without serious comorbidity with body mass index (BMI) of 34.0 to 34.9 in adult  -     Comprehensive Metabolic Panel; Future  -     CBC; Future  -     TSH without Reflex; Future  -     Lipid Panel; Future      1: Right breast and upper extremity pain: Uncertain etiology. Patient is followed closely at the [facility]. 2: Establish care with new provider: Medical history and social history were discussed. Health maintenance requirements were discussed with Cologuard being ordered.   Physical exam completed. Labs ordered. Follow-up in 1 year, as patient is leaving the state for a job for 1 year. Patient may come in sooner if needed for medical concerns. Patient advised to call at any time to cancel or re-scheduleor for any questions/concerns. Please note that >15 minutes was spent face-to-face with the patient gathering history, performing physical exam, discussing findings, counseling the patient, and determining planforward. All questions and concerns addressed and answered.          Lyubov Florence DO   10/1/20  10:57 AM

## 2020-10-21 ENCOUNTER — HOSPITAL ENCOUNTER (OUTPATIENT)
Age: 50
Discharge: HOME | End: 2020-10-23
Payer: COMMERCIAL

## 2020-10-21 LAB
ALBUMIN SERPL-MCNC: 4.5 G/DL (ref 3.5–5.2)
ALP BLD-CCNC: 79 U/L (ref 35–104)
ALT SERPL-CCNC: 13 U/L (ref 0–32)
ANION GAP SERPL CALCULATED.3IONS-SCNC: 12 MMOL/L (ref 7–16)
AST SERPL-CCNC: 13 U/L (ref 0–31)
BILIRUB SERPL-MCNC: 0.4 MG/DL (ref 0–1.2)
BUN BLDV-MCNC: 13 MG/DL (ref 6–20)
CALCIUM SERPL-MCNC: 9.7 MG/DL (ref 8.6–10.2)
CHLORIDE BLD-SCNC: 105 MMOL/L (ref 98–107)
CHOLESTEROL, TOTAL: 198 MG/DL (ref 0–199)
CO2: 24 MMOL/L (ref 22–29)
CREAT SERPL-MCNC: 0.8 MG/DL (ref 0.5–1)
GFR AFRICAN AMERICAN: >60
GFR NON-AFRICAN AMERICAN: >60 ML/MIN/1.73
GLUCOSE BLD-MCNC: 97 MG/DL (ref 74–99)
HCT VFR BLD CALC: 40.5 % (ref 34–48)
HDLC SERPL-MCNC: 60 MG/DL
HEMOGLOBIN: 12.4 G/DL (ref 11.5–15.5)
LDL CHOLESTEROL CALCULATED: 118 MG/DL (ref 0–99)
MCH RBC QN AUTO: 28.2 PG (ref 26–35)
MCHC RBC AUTO-ENTMCNC: 30.6 % (ref 32–34.5)
MCV RBC AUTO: 92.3 FL (ref 80–99.9)
PDW BLD-RTO: 14.2 FL (ref 11.5–15)
PLATELET # BLD: 377 E9/L (ref 130–450)
PMV BLD AUTO: 10.2 FL (ref 7–12)
POTASSIUM SERPL-SCNC: 4.5 MMOL/L (ref 3.5–5)
RBC # BLD: 4.39 E12/L (ref 3.5–5.5)
SODIUM BLD-SCNC: 141 MMOL/L (ref 132–146)
TOTAL PROTEIN: 7.4 G/DL (ref 6.4–8.3)
TRIGL SERPL-MCNC: 102 MG/DL (ref 0–149)
TSH SERPL DL<=0.05 MIU/L-ACNC: 1 UIU/ML (ref 0.27–4.2)
VLDLC SERPL CALC-MCNC: 20 MG/DL
WBC # BLD: 7.4 E9/L (ref 4.5–11.5)

## 2020-10-21 PROCEDURE — 85027 COMPLETE CBC AUTOMATED: CPT

## 2020-10-21 PROCEDURE — 80061 LIPID PANEL: CPT

## 2020-10-21 PROCEDURE — 84443 ASSAY THYROID STIM HORMONE: CPT

## 2020-10-21 PROCEDURE — 80053 COMPREHEN METABOLIC PANEL: CPT

## 2020-10-21 PROCEDURE — 36415 COLL VENOUS BLD VENIPUNCTURE: CPT

## 2020-10-27 ENCOUNTER — TELEPHONE (OUTPATIENT)
Dept: FAMILY MEDICINE CLINIC | Age: 50
End: 2020-10-27

## 2020-10-27 NOTE — TELEPHONE ENCOUNTER
A message from the patient:    Dr Dali Parker. A few days ago I started having a continuous ringing in my ears. I didnt go To a loud rock concert , I have few months dealt with extreme tiredness like I can go to bed after waking up and I sleep. My blood work seems ok hypothyroidism mago? Ear infection? Any thoughts I may go insane soon if the ringing doesnt stop.

## 2020-10-28 NOTE — TELEPHONE ENCOUNTER
Dr. Derrick Warren,  Your labs are unremarkable. Thyroid function was tested with TSH level- found to be in normal range. Tinnitus is very uncomfortable for most patients. If you have had hearing loss or vertigo, please let me know. Those would be signs of meniere's disease, and would need further work-up. Otherwise, tinnitus is usually a benign, but very annoying, problem. Some patients do well with a fan on constantly or with TV/radio on for background noise. Fatigue is likely not a related problem and needs a different work-up or a separate visit. Let me know if you'd like to come in for a visit or even do a virtual visit.   Dr. Anton Posadas.

## 2020-10-30 ENCOUNTER — VIRTUAL VISIT (OUTPATIENT)
Dept: FAMILY MEDICINE CLINIC | Age: 50
End: 2020-10-30
Payer: COMMERCIAL

## 2020-10-30 PROCEDURE — 99442 PR PHYS/QHP TELEPHONE EVALUATION 11-20 MIN: CPT | Performed by: FAMILY MEDICINE

## 2020-10-30 RX ORDER — ACYCLOVIR 400 MG/1
TABLET ORAL
Qty: 30 TABLET | Refills: 0 | Status: SHIPPED | OUTPATIENT
Start: 2020-10-30

## 2020-10-30 NOTE — PROGRESS NOTES
Yonis Hill is a 48 y.o. female evaluated via telephone on 10/30/2020. Consent:  She and/or health care decision maker is aware that that she may receive a bill for this telephone service, depending on her insurance coverage, and has provided verbal consent to proceed: Yes      Documentation:  I communicated with the patient and/or health care decision maker about fatigue. Details of this discussion including any medical advice provided:     Cologuard-the phone number to the company remains busy. Patient wonders how to contact them. She has considered waiting until she moves to her new job to have this completed. Fatigue: Pt states that she has had fatigue. Concerned about heavy period, however, it has not been as heavy as prior to starting the progestin. Discussion held about recent lab work including normal hemoglobin hematocrit. Patient understands the fatigue is not due to anemia. She has had anemia in the past with a hemoglobin of 9. Patient did start an iron pill on her own. 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 She states that her sleep cycle has been significantly altered due to this. She also admits to drinking large quantities of coffee during this time. Patient is taking vitamin D and vitamin B12 on a daily basis, so these levels do not require being checked at this point. Patient does not want medications for anxiety or depression at this time. She understands that her fatigue is likely due to stress and poor sleep patterns. This should improve once she is done moving and has completed her work. Cold sores-patient was prescribed acyclovir 400mg 3 times daily for 5 days at the outset of an outbreak.   She has taken this with good improvement in the past.      I affirm this is a Patient Initiated Episode with a Patient who has not had a related appointment within my department in the past 7 days or scheduled within the next 24 hours.     Patient identification was verified at the start of the visit: Yes    Total Time: minutes: 11-20 minutes    Note: not billable if this call serves to triage the patient into an appointment for the relevant concern      Cristian Guillen DO  10/30/20  10:20 AM

## 2021-09-07 RX ORDER — ACYCLOVIR 400 MG/1
TABLET ORAL
Qty: 30 TABLET | Refills: 0 | OUTPATIENT
Start: 2021-09-07

## 2021-09-13 ENCOUNTER — TELEPHONE (OUTPATIENT)
Dept: OBGYN | Age: 51
End: 2021-09-13

## 2021-09-13 NOTE — TELEPHONE ENCOUNTER
Patient called in and had questions and concerns related to the covid vaccine. Patient no longer lives in hospitals. Patient stated that she had two previous IUD placements that did not stay in then you prescribed Provera. Patient stated that once she moved and filled the prescription she developed facial swelling that would move from lips to cheeks to upper forehead. Patient went to local ER and was told to discontinue medication. Patient still struggles with the heavy periods and now her work is requiring the vaccine but she is unsure if she should get it. She is afraid it may make her bleeding worse. I asked patient if she found a GYN or a PCP and she has not. I informed patient she may need to find a GYN where she lives and we would fax her previous history. Patient still wanted to know what you thought and if she shouldn't get it she would need a letter stating that.

## 2021-09-15 NOTE — TELEPHONE ENCOUNTER
Called patient today. Patient had been started on provera daily to control heavy bleeding. She took it for two months without problems. She then moved to [place] and refilled her script there. She immediately developed facial swelling. She went to the ED and they attributed it to provera, she then discontinued it. Patient has still had heavy bleeding though it is spacing out. She states her work is requiring her to get the COVID vaccine but she does not want it at this time as she is worried about a reaction. She will need a note from a physician to be exempt. Discussed that I cannot write this letter as she will likely be ok to receive the vaccine, despite her reaction to provera. Patient agreeable. All questions were answered.

## 2023-02-17 ENCOUNTER — OFFICE VISIT (OUTPATIENT)
Dept: PRIMARY CARE CLINIC | Age: 53
End: 2023-02-17

## 2023-02-17 VITALS
DIASTOLIC BLOOD PRESSURE: 72 MMHG | SYSTOLIC BLOOD PRESSURE: 118 MMHG | HEART RATE: 76 BPM | OXYGEN SATURATION: 99 % | HEIGHT: 68 IN | BODY MASS INDEX: 36.28 KG/M2 | WEIGHT: 239.4 LBS | TEMPERATURE: 97.3 F

## 2023-02-17 DIAGNOSIS — Z11.4 SCREENING FOR HIV WITHOUT PRESENCE OF RISK FACTORS: ICD-10-CM

## 2023-02-17 DIAGNOSIS — Z00.00 WELL ADULT EXAM: ICD-10-CM

## 2023-02-17 DIAGNOSIS — B00.1 RECURRENT COLD SORES: ICD-10-CM

## 2023-02-17 DIAGNOSIS — Z13.220 SCREENING CHOLESTEROL LEVEL: ICD-10-CM

## 2023-02-17 DIAGNOSIS — Z12.11 SCREENING FOR COLON CANCER: ICD-10-CM

## 2023-02-17 DIAGNOSIS — Z13.220 SCREENING CHOLESTEROL LEVEL: Primary | ICD-10-CM

## 2023-02-17 DIAGNOSIS — Z11.59 NEED FOR HEPATITIS C SCREENING TEST: ICD-10-CM

## 2023-02-17 LAB
ALBUMIN SERPL-MCNC: 4.5 G/DL (ref 3.5–5.2)
ALP BLD-CCNC: 127 U/L (ref 35–104)
ALT SERPL-CCNC: 27 U/L (ref 0–32)
ANION GAP SERPL CALCULATED.3IONS-SCNC: 16 MMOL/L (ref 7–16)
AST SERPL-CCNC: 22 U/L (ref 0–31)
BASOPHILS ABSOLUTE: 0.07 E9/L (ref 0–0.2)
BASOPHILS RELATIVE PERCENT: 1.1 % (ref 0–2)
BILIRUB SERPL-MCNC: 0.5 MG/DL (ref 0–1.2)
BUN BLDV-MCNC: 12 MG/DL (ref 6–20)
CALCIUM SERPL-MCNC: 10.1 MG/DL (ref 8.6–10.2)
CHLORIDE BLD-SCNC: 104 MMOL/L (ref 98–107)
CHOLESTEROL, TOTAL: 216 MG/DL (ref 0–199)
CO2: 21 MMOL/L (ref 22–29)
CREAT SERPL-MCNC: 0.7 MG/DL (ref 0.5–1)
EOSINOPHILS ABSOLUTE: 0.15 E9/L (ref 0.05–0.5)
EOSINOPHILS RELATIVE PERCENT: 2.4 % (ref 0–6)
GFR SERPL CREATININE-BSD FRML MDRD: >60 ML/MIN/1.73
GLUCOSE BLD-MCNC: 94 MG/DL (ref 74–99)
HCT VFR BLD CALC: 40.9 % (ref 34–48)
HDLC SERPL-MCNC: 64 MG/DL
HEMOGLOBIN: 13.3 G/DL (ref 11.5–15.5)
IMMATURE GRANULOCYTES #: 0.01 E9/L
IMMATURE GRANULOCYTES %: 0.2 % (ref 0–5)
LDL CHOLESTEROL CALCULATED: 125 MG/DL (ref 0–99)
LYMPHOCYTES ABSOLUTE: 2.06 E9/L (ref 1.5–4)
LYMPHOCYTES RELATIVE PERCENT: 32.6 % (ref 20–42)
MCH RBC QN AUTO: 28.4 PG (ref 26–35)
MCHC RBC AUTO-ENTMCNC: 32.5 % (ref 32–34.5)
MCV RBC AUTO: 87.4 FL (ref 80–99.9)
MONOCYTES ABSOLUTE: 0.5 E9/L (ref 0.1–0.95)
MONOCYTES RELATIVE PERCENT: 7.9 % (ref 2–12)
NEUTROPHILS ABSOLUTE: 3.53 E9/L (ref 1.8–7.3)
NEUTROPHILS RELATIVE PERCENT: 55.8 % (ref 43–80)
PDW BLD-RTO: 13.2 FL (ref 11.5–15)
PLATELET # BLD: 313 E9/L (ref 130–450)
PMV BLD AUTO: 11 FL (ref 7–12)
POTASSIUM SERPL-SCNC: 4.3 MMOL/L (ref 3.5–5)
RBC # BLD: 4.68 E12/L (ref 3.5–5.5)
SODIUM BLD-SCNC: 141 MMOL/L (ref 132–146)
TOTAL PROTEIN: 8 G/DL (ref 6.4–8.3)
TRIGL SERPL-MCNC: 134 MG/DL (ref 0–149)
TSH SERPL DL<=0.05 MIU/L-ACNC: 0.69 UIU/ML (ref 0.27–4.2)
VLDLC SERPL CALC-MCNC: 27 MG/DL
WBC # BLD: 6.3 E9/L (ref 4.5–11.5)

## 2023-02-17 RX ORDER — ACYCLOVIR 400 MG/1
TABLET ORAL
Qty: 30 TABLET | Refills: 0 | Status: SHIPPED | OUTPATIENT
Start: 2023-02-17

## 2023-02-17 SDOH — ECONOMIC STABILITY: HOUSING INSECURITY
IN THE LAST 12 MONTHS, WAS THERE A TIME WHEN YOU DID NOT HAVE A STEADY PLACE TO SLEEP OR SLEPT IN A SHELTER (INCLUDING NOW)?: NO

## 2023-02-17 SDOH — ECONOMIC STABILITY: FOOD INSECURITY: WITHIN THE PAST 12 MONTHS, THE FOOD YOU BOUGHT JUST DIDN'T LAST AND YOU DIDN'T HAVE MONEY TO GET MORE.: NEVER TRUE

## 2023-02-17 SDOH — ECONOMIC STABILITY: FOOD INSECURITY: WITHIN THE PAST 12 MONTHS, YOU WORRIED THAT YOUR FOOD WOULD RUN OUT BEFORE YOU GOT MONEY TO BUY MORE.: NEVER TRUE

## 2023-02-17 SDOH — ECONOMIC STABILITY: INCOME INSECURITY: HOW HARD IS IT FOR YOU TO PAY FOR THE VERY BASICS LIKE FOOD, HOUSING, MEDICAL CARE, AND HEATING?: NOT HARD AT ALL

## 2023-02-17 ASSESSMENT — PATIENT HEALTH QUESTIONNAIRE - PHQ9
SUM OF ALL RESPONSES TO PHQ QUESTIONS 1-9: 0
1. LITTLE INTEREST OR PLEASURE IN DOING THINGS: 0
2. FEELING DOWN, DEPRESSED OR HOPELESS: 0
SUM OF ALL RESPONSES TO PHQ9 QUESTIONS 1 & 2: 0
SUM OF ALL RESPONSES TO PHQ QUESTIONS 1-9: 0

## 2023-02-17 NOTE — PROGRESS NOTES
Subjective:  46 y.o. female who presents to the office today with chief complaint:  Chief Complaint   Patient presents with    Annual Exam     Labs    Health Maintenance     Tdap & Colonoscopy       Patient here for routine physical examination and lab work. 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 States she is doing well at this time. Requests refill on acyclovir for occasional cold sore breakouts. Given requisition for Cologuard in the past but was unable to complete. Due for routine labs and some health screenings today. Follows with OB/GYN and will be due for Pap and mammogram.    Health Maintenance Due   Topic Date Due    COVID-19 Vaccine (1) Never done    Depression Screen  Never done    HIV screen  Never done    Hepatitis C screen  Never done    DTaP/Tdap/Td vaccine (1 - Tdap) Never done    Colorectal Cancer Screen  Never done    Shingles vaccine (1 of 2) Never done    Breast cancer screen  12/10/2021    Flu vaccine (1) 08/01/2022       Cancer History:  Family Cancer History:    Review of Systems    Review of Systems: All bolded are positive, all others are negative. General:  Fever, chills, diaphoresis, fatigue, malaise, night sweats, weight loss  Psychological:  Anxiety, disorientation, hallucinations. ENT:  Epistaxis, headaches, vertigo, visual changes. Cardiovascular:  Chest pain, irregular heartbeats, palpitations, paroxysmal nocturnal dyspnea. Respiratory:  Shortness of breath, coughing, sputum production, hemoptysis, wheezing, orthopnea.   Gastrointestinal:  Nausea, vomiting, diarrhea, heartburn, constipation, abdominal pain, hematemesis, hematochezia, melena, acholic stools  Genito-Urinary:  Dysuria, urgency, frequency, hematuria  Musculoskeletal:  Joint pain, joint stiffness, joint swelling, muscle pain  Neurology:  Headache, focal neurological deficits, weakness, numbness, paresthesia  Derm:  Rashes, ulcers, excoriations, bruising  Extremities:  Decreased ROM, peripheral edema, mottling      Objective:  Vitals:    02/17/23 0952   BP: 118/72   Pulse: 76   Temp: 97.3 °F (36.3 °C)   SpO2: 99%     Physical Exam  Vitals and nursing note reviewed. Constitutional:       General: She is not in acute distress. Appearance: Normal appearance. She is not ill-appearing. HENT:      Head: Normocephalic and atraumatic. Right Ear: Tympanic membrane, ear canal and external ear normal.      Left Ear: Tympanic membrane, ear canal and external ear normal.      Nose: Nose normal.      Mouth/Throat:      Pharynx: Oropharynx is clear. Eyes:      Extraocular Movements: Extraocular movements intact. Conjunctiva/sclera: Conjunctivae normal.      Pupils: Pupils are equal, round, and reactive to light. Cardiovascular:      Rate and Rhythm: Normal rate and regular rhythm. Pulses: Normal pulses. Heart sounds: Normal heart sounds. Pulmonary:      Effort: Pulmonary effort is normal.      Breath sounds: Normal breath sounds. Abdominal:      General: Abdomen is flat. Bowel sounds are normal.      Palpations: Abdomen is soft. Musculoskeletal:         General: Normal range of motion. Cervical back: Normal range of motion. Skin:     General: Skin is warm and dry. Neurological:      Mental Status: She is alert.          Results for orders placed or performed during the hospital encounter of 10/21/20   Lipid Panel   Result Value Ref Range    Cholesterol, Total 198 0 - 199 mg/dL    Triglycerides 102 0 - 149 mg/dL    HDL 60 >40 mg/dL    LDL Calculated 118 (H) 0 - 99 mg/dL    VLDL Cholesterol Calculated 20 mg/dL   TSH without Reflex   Result Value Ref Range    TSH 0.996 0.270 - 4.200 uIU/mL   CBC   Result Value Ref Range    WBC 7.4 4.5 - 11.5 E9/L    RBC 4.39 3.50 - 5.50 E12/L    Hemoglobin 12.4 11.5 - 15.5 g/dL    Hematocrit 40.5 34.0 - 48.0 %    MCV 92.3 80.0 - 99.9 fL    MCH 28.2 26.0 - 35.0 pg    MCHC 30.6 (L) 32.0 - 34.5 %    RDW 14.2 11.5 - 15.0 fL Platelets 998 907 - 822 E9/L    MPV 10.2 7.0 - 12.0 fL   Comprehensive Metabolic Panel   Result Value Ref Range    Sodium 141 132 - 146 mmol/L    Potassium 4.5 3.5 - 5.0 mmol/L    Chloride 105 98 - 107 mmol/L    CO2 24 22 - 29 mmol/L    Anion Gap 12 7 - 16 mmol/L    Glucose 97 74 - 99 mg/dL    BUN 13 6 - 20 mg/dL    Creatinine 0.8 0.5 - 1.0 mg/dL    GFR Non-African American >60 >=60 mL/min/1.73    GFR African American >60     Calcium 9.7 8.6 - 10.2 mg/dL    Total Protein 7.4 6.4 - 8.3 g/dL    Albumin 4.5 3.5 - 5.2 g/dL    Total Bilirubin 0.4 0.0 - 1.2 mg/dL    Alkaline Phosphatase 79 35 - 104 U/L    ALT 13 0 - 32 U/L    AST 13 0 - 31 U/L         Assessment and Plan:  Josué Ramon was seen today for annual exam and health maintenance. Diagnoses and all orders for this visit:    Screening cholesterol level  -     CBC with Auto Differential; Future  -     Comprehensive Metabolic Panel; Future  -     Lipid Panel; Future  -     TSH; Future    Recurrent cold sores  -     acyclovir (ZOVIRAX) 400 MG tablet; Take 1 tab TID x 5 D when outbreak occurs. -     CBC with Auto Differential; Future  -     Comprehensive Metabolic Panel; Future  -     Lipid Panel; Future  -     TSH; Future    Well adult exam  -     CBC with Auto Differential; Future  -     Comprehensive Metabolic Panel; Future  -     Lipid Panel; Future  -     TSH; Future    Screening for HIV without presence of risk factors  -     HIV Screen; Future    Need for hepatitis C screening test  -     Hepatitis C Antibody; Future    Screening for colon cancer  -     Fecal DNA Colorectal cancer screening (Cologuard)      Recurrent cold sores: Acyclovir 400 mg 3 times daily x5 days for outbreaks. Collect CBC, CMP, TSH, and lipid panel. HIV and hepatitis C screens ordered. Reissued order for Cologuard. Will follow-up with OB/GYN regarding Pap and mammogram.    Return in about 6 months (around 8/17/2023). Patient may come in sooner if needed for medical concerns. Patient advised to call at any time to cancel, re-schedule, or for any questions/concerns.             Chi Marcial PA-C    2/17/23  11:24 AM

## 2023-02-20 LAB — HIV-1 AND HIV-2 ANTIBODIES: NORMAL

## 2023-02-24 LAB — HEPATITIS C ANTIBODY INTERPRETATION: NORMAL

## 2023-04-03 LAB — NONINV COLON CA DNA+OCC BLD SCRN STL QL: NEGATIVE

## 2023-08-18 ENCOUNTER — OFFICE VISIT (OUTPATIENT)
Dept: PRIMARY CARE CLINIC | Age: 53
End: 2023-08-18

## 2023-08-18 VITALS
OXYGEN SATURATION: 96 % | WEIGHT: 235.7 LBS | HEIGHT: 68 IN | TEMPERATURE: 97.5 F | DIASTOLIC BLOOD PRESSURE: 76 MMHG | HEART RATE: 74 BPM | BODY MASS INDEX: 35.72 KG/M2 | SYSTOLIC BLOOD PRESSURE: 108 MMHG

## 2023-08-18 DIAGNOSIS — E78.5 HYPERLIPIDEMIA, UNSPECIFIED HYPERLIPIDEMIA TYPE: ICD-10-CM

## 2023-08-18 DIAGNOSIS — Z23 NEED FOR PROPHYLACTIC VACCINATION AND INOCULATION AGAINST VARICELLA: ICD-10-CM

## 2023-08-18 DIAGNOSIS — Z23 NEED FOR DIPHTHERIA-TETANUS-PERTUSSIS (TDAP) VACCINE: ICD-10-CM

## 2023-08-18 DIAGNOSIS — E78.5 HYPERLIPIDEMIA, UNSPECIFIED HYPERLIPIDEMIA TYPE: Primary | ICD-10-CM

## 2023-08-18 DIAGNOSIS — Z00.00 WELL ADULT EXAM: ICD-10-CM

## 2023-08-18 DIAGNOSIS — B00.1 RECURRENT COLD SORES: ICD-10-CM

## 2023-08-18 LAB
ABSOLUTE IMMATURE GRANULOCYTE: <0.03 K/UL (ref 0–0.58)
ALBUMIN SERPL-MCNC: 4.4 G/DL (ref 3.5–5.2)
ALP BLD-CCNC: 119 U/L (ref 35–104)
ALT SERPL-CCNC: 21 U/L (ref 0–32)
ANION GAP SERPL CALCULATED.3IONS-SCNC: 11 MMOL/L (ref 7–16)
AST SERPL-CCNC: 18 U/L (ref 0–31)
BASOPHILS ABSOLUTE: 0.05 K/UL (ref 0–0.2)
BASOPHILS RELATIVE PERCENT: 1 % (ref 0–2)
BILIRUB SERPL-MCNC: 0.7 MG/DL (ref 0–1.2)
BUN BLDV-MCNC: 13 MG/DL (ref 6–20)
CALCIUM SERPL-MCNC: 9.6 MG/DL (ref 8.6–10.2)
CHLORIDE BLD-SCNC: 102 MMOL/L (ref 98–107)
CHOLESTEROL: 186 MG/DL
CO2: 23 MMOL/L (ref 22–29)
CREAT SERPL-MCNC: 0.8 MG/DL (ref 0.5–1)
EOSINOPHILS ABSOLUTE: 0.17 K/UL (ref 0.05–0.5)
EOSINOPHILS RELATIVE PERCENT: 3 % (ref 0–6)
GFR SERPL CREATININE-BSD FRML MDRD: >60 ML/MIN/1.73M2
GLUCOSE BLD-MCNC: 86 MG/DL (ref 74–99)
HCT VFR BLD CALC: 39 % (ref 34–48)
HDLC SERPL-MCNC: 53 MG/DL
HEMOGLOBIN: 12.7 G/DL (ref 11.5–15.5)
IMMATURE GRANULOCYTES: 0 % (ref 0–5)
LDL CHOLESTEROL: 108 MG/DL
LYMPHOCYTES ABSOLUTE: 1.8 K/UL (ref 1.5–4)
LYMPHOCYTES RELATIVE PERCENT: 30 % (ref 20–42)
MCH RBC QN AUTO: 29.3 PG (ref 26–35)
MCHC RBC AUTO-ENTMCNC: 32.6 G/DL (ref 32–34.5)
MCV RBC AUTO: 89.9 FL (ref 80–99.9)
MONOCYTES ABSOLUTE: 0.44 K/UL (ref 0.1–0.95)
MONOCYTES RELATIVE PERCENT: 7 % (ref 2–12)
NEUTROPHILS ABSOLUTE: 3.55 K/UL (ref 1.8–7.3)
NEUTROPHILS RELATIVE PERCENT: 59 % (ref 43–80)
PDW BLD-RTO: 13.2 % (ref 11.5–15)
PLATELET # BLD: 304 K/UL (ref 130–450)
PMV BLD AUTO: 10.7 FL (ref 7–12)
POTASSIUM SERPL-SCNC: 4.4 MMOL/L (ref 3.5–5)
RBC # BLD: 4.34 M/UL (ref 3.5–5.5)
SODIUM BLD-SCNC: 136 MMOL/L (ref 132–146)
TOTAL PROTEIN: 7.5 G/DL (ref 6.4–8.3)
TRIGL SERPL-MCNC: 127 MG/DL
TSH SERPL DL<=0.05 MIU/L-ACNC: 1.07 UIU/ML (ref 0.27–4.2)
VLDLC SERPL CALC-MCNC: 25 MG/DL
WBC # BLD: 6 K/UL (ref 4.5–11.5)

## 2023-08-18 RX ORDER — TRETINOIN 1 MG/G
CREAM TOPICAL
COMMUNITY
Start: 2023-06-04

## 2023-08-18 ASSESSMENT — ENCOUNTER SYMPTOMS
BACK PAIN: 0
RHINORRHEA: 0
SHORTNESS OF BREATH: 0
CONSTIPATION: 0
SORE THROAT: 0
DIARRHEA: 0
WHEEZING: 0
ABDOMINAL PAIN: 0
VOMITING: 0
COUGH: 0
PHOTOPHOBIA: 0

## 2023-08-18 NOTE — PROGRESS NOTES
Venipuncture was obtained from right arm. Patient tolerated the procedure without complications or complaints.
improvement. Given requisites to complete tetanus update and shingles vaccination to complete at pharmacy    Return in about 1 year (around 8/18/2024). Patient may come in sooner if needed for medical concerns. Patient advised to call at any time to cancel, re-schedule, or for any questions/concerns.     Link Goodpasture, PA-C  8/18/23

## 2023-08-25 ENCOUNTER — TELEPHONE (OUTPATIENT)
Dept: PRIMARY CARE CLINIC | Age: 53
End: 2023-08-25

## 2024-01-04 ENCOUNTER — OFFICE VISIT (OUTPATIENT)
Dept: OBGYN | Age: 54
End: 2024-01-04
Payer: COMMERCIAL

## 2024-01-04 VITALS
SYSTOLIC BLOOD PRESSURE: 135 MMHG | HEART RATE: 81 BPM | HEIGHT: 68 IN | DIASTOLIC BLOOD PRESSURE: 67 MMHG | WEIGHT: 235 LBS | BODY MASS INDEX: 35.61 KG/M2

## 2024-01-04 DIAGNOSIS — Z12.4 SCREENING FOR CERVICAL CANCER: ICD-10-CM

## 2024-01-04 DIAGNOSIS — Z12.31 ENCOUNTER FOR SCREENING MAMMOGRAM FOR BREAST CANCER: Primary | ICD-10-CM

## 2024-01-04 DIAGNOSIS — Z01.419 ENCOUNTER FOR WELL WOMAN EXAM: ICD-10-CM

## 2024-01-04 PROCEDURE — 99386 PREV VISIT NEW AGE 40-64: CPT | Performed by: OBSTETRICS & GYNECOLOGY

## 2024-01-04 NOTE — PROGRESS NOTES
Selma Jones     Patient presents for annual exam.     Patient has not had a cycle for 6 months. Discussed approaching menopause is most likely cause.     No past medical history on file.     Past Surgical History:   Procedure Laterality Date    ANKLE FRACTURE SURGERY  2006    TUBAL LIGATION          Family History   Problem Relation Age of Onset    Cancer Father 65        multiple myleoma    High Blood Pressure Father     Cancer Paternal Grandmother         breast    Other Other 55        half sister - (same father) - breast    Cancer Brother         unknown type - jaw    Lung Cancer Brother 55    Other Other 62        non hodgkins lymphoma    Cancer Paternal Aunt         unknown type    Cancer Paternal Uncle         unknown type - possible kidney    Cancer Sister 50        breast    Cancer Sister 61        lymphoma (not sure of type)          Current Outpatient Medications:     acyclovir (ZOVIRAX) 400 MG tablet, Take 1 tab TID x 5 D when outbreak occurs., Disp: 30 tablet, Rfl: 0    Cholecalciferol (VITAMIN D) 50 MCG (2000 UT) CAPS capsule, Take by mouth, Disp: , Rfl:     b complex vitamins capsule, Take 1 capsule by mouth daily, Disp: , Rfl:     vitamin C (ASCORBIC ACID) 500 MG tablet, Take 1 tablet by mouth daily, Disp: , Rfl:     tretinoin (RETIN-A) 0.1 % cream, APPLY THIN LAYER TOPICALLY TO THE AFFECTED AREA EVERY DAY IN THE EVENING (Patient not taking: Reported on 1/4/2024), Disp: , Rfl:     Multiple Vitamins-Minerals (THERAPEUTIC MULTIVITAMIN-MINERALS) tablet, Take 1 tablet by mouth daily (Patient not taking: Reported on 8/18/2023), Disp: , Rfl:     vitamin E 1000 units capsule, Take 1,000 Units by mouth daily, Disp: , Rfl:      Allergies   Allergen Reactions    Medroxyprogesterone Swelling        Social History       Tobacco History       Smoking Status  Never      Smokeless Tobacco Use  Never              Alcohol History       Alcohol Use Status  No              Drug Use       Drug Use Status  No

## 2024-01-04 NOTE — PROGRESS NOTES
New patient alert and pleasant with no complaints.  Here today for annual GYN exam.  Pelvic exam, pap smear obtained, labeled  and delivered to lab.  Breast exam completed by doctor.   Discharge instructions have been discussed with the patient. Patient advised to call our office with any questions or concerns.   Voiced understanding.

## 2024-01-10 LAB — GYNECOLOGY CYTOLOGY REPORT: NORMAL

## 2024-02-09 ENCOUNTER — HOSPITAL ENCOUNTER (OUTPATIENT)
Dept: GENERAL RADIOLOGY | Age: 54
Discharge: HOME | End: 2024-02-09
Attending: OBSTETRICS & GYNECOLOGY
Payer: COMMERCIAL

## 2024-02-09 VITALS — BODY MASS INDEX: 34.86 KG/M2 | WEIGHT: 230 LBS | HEIGHT: 68 IN

## 2024-02-09 DIAGNOSIS — Z12.31 ENCOUNTER FOR SCREENING MAMMOGRAM FOR BREAST CANCER: ICD-10-CM

## 2024-02-09 PROCEDURE — 77063 BREAST TOMOSYNTHESIS BI: CPT

## 2024-02-13 DIAGNOSIS — R92.333 HETEROGENEOUSLY DENSE TISSUE OF BOTH BREASTS ON MAMMOGRAPHY: Primary | ICD-10-CM

## 2024-02-24 ENCOUNTER — HOSPITAL ENCOUNTER (OUTPATIENT)
Dept: GENERAL RADIOLOGY | Age: 54
End: 2024-02-24
Payer: COMMERCIAL

## 2024-02-24 VITALS — HEIGHT: 69 IN | WEIGHT: 225 LBS | BODY MASS INDEX: 33.33 KG/M2

## 2024-02-24 DIAGNOSIS — R92.333 HETEROGENEOUSLY DENSE TISSUE OF BOTH BREASTS ON MAMMOGRAPHY: ICD-10-CM

## 2024-02-24 PROCEDURE — 76641 ULTRASOUND BREAST COMPLETE: CPT

## 2024-08-13 ASSESSMENT — PATIENT HEALTH QUESTIONNAIRE - PHQ9
1. LITTLE INTEREST OR PLEASURE IN DOING THINGS: NOT AT ALL
2. FEELING DOWN, DEPRESSED OR HOPELESS: NOT AT ALL
SUM OF ALL RESPONSES TO PHQ QUESTIONS 1-9: 0
SUM OF ALL RESPONSES TO PHQ QUESTIONS 1-9: 0
SUM OF ALL RESPONSES TO PHQ9 QUESTIONS 1 & 2: 0
2. FEELING DOWN, DEPRESSED OR HOPELESS: NOT AT ALL
SUM OF ALL RESPONSES TO PHQ QUESTIONS 1-9: 0
SUM OF ALL RESPONSES TO PHQ9 QUESTIONS 1 & 2: 0
SUM OF ALL RESPONSES TO PHQ QUESTIONS 1-9: 0
1. LITTLE INTEREST OR PLEASURE IN DOING THINGS: NOT AT ALL

## 2024-08-16 ENCOUNTER — OFFICE VISIT (OUTPATIENT)
Dept: PRIMARY CARE CLINIC | Age: 54
End: 2024-08-16
Payer: COMMERCIAL

## 2024-08-16 VITALS
SYSTOLIC BLOOD PRESSURE: 112 MMHG | BODY MASS INDEX: 34.88 KG/M2 | TEMPERATURE: 97.7 F | WEIGHT: 235.5 LBS | OXYGEN SATURATION: 96 % | DIASTOLIC BLOOD PRESSURE: 80 MMHG | HEART RATE: 77 BPM | HEIGHT: 69 IN

## 2024-08-16 DIAGNOSIS — E55.9 VITAMIN D DEFICIENCY: ICD-10-CM

## 2024-08-16 DIAGNOSIS — Z00.00 WELL ADULT EXAM: Primary | ICD-10-CM

## 2024-08-16 DIAGNOSIS — E78.5 DYSLIPIDEMIA: ICD-10-CM

## 2024-08-16 LAB
ALBUMIN: 4.5 G/DL (ref 3.5–5.2)
ALP BLD-CCNC: 113 U/L (ref 35–104)
ALT SERPL-CCNC: 31 U/L (ref 0–32)
ANION GAP SERPL CALCULATED.3IONS-SCNC: 15 MMOL/L (ref 7–16)
AST SERPL-CCNC: 22 U/L (ref 0–31)
BASOPHILS ABSOLUTE: 0.04 K/UL (ref 0–0.2)
BASOPHILS RELATIVE PERCENT: 1 % (ref 0–2)
BILIRUB SERPL-MCNC: 0.6 MG/DL (ref 0–1.2)
BUN BLDV-MCNC: 13 MG/DL (ref 6–20)
CALCIUM SERPL-MCNC: 10.3 MG/DL (ref 8.6–10.2)
CHLORIDE BLD-SCNC: 103 MMOL/L (ref 98–107)
CHOLESTEROL, TOTAL: 222 MG/DL
CO2: 20 MMOL/L (ref 22–29)
CREAT SERPL-MCNC: 0.7 MG/DL (ref 0.5–1)
EOSINOPHILS ABSOLUTE: 0.12 K/UL (ref 0.05–0.5)
EOSINOPHILS RELATIVE PERCENT: 2 % (ref 0–6)
GFR, ESTIMATED: >90 ML/MIN/1.73M2
GLUCOSE BLD-MCNC: 99 MG/DL (ref 74–99)
HCT VFR BLD CALC: 43.9 % (ref 34–48)
HDLC SERPL-MCNC: 57 MG/DL
HEMOGLOBIN: 14.4 G/DL (ref 11.5–15.5)
IMMATURE GRANULOCYTES %: 0 % (ref 0–5)
IMMATURE GRANULOCYTES ABSOLUTE: <0.03 K/UL (ref 0–0.58)
LDL CHOLESTEROL: 139 MG/DL
LYMPHOCYTES ABSOLUTE: 1.87 K/UL (ref 1.5–4)
LYMPHOCYTES RELATIVE PERCENT: 34 % (ref 20–42)
MCH RBC QN AUTO: 29.6 PG (ref 26–35)
MCHC RBC AUTO-ENTMCNC: 32.8 G/DL (ref 32–34.5)
MCV RBC AUTO: 90.1 FL (ref 80–99.9)
MONOCYTES ABSOLUTE: 0.39 K/UL (ref 0.1–0.95)
MONOCYTES RELATIVE PERCENT: 7 % (ref 2–12)
NEUTROPHILS ABSOLUTE: 3.1 K/UL (ref 1.8–7.3)
NEUTROPHILS RELATIVE PERCENT: 56 % (ref 43–80)
PDW BLD-RTO: 13.1 % (ref 11.5–15)
PLATELET # BLD: 311 K/UL (ref 130–450)
PMV BLD AUTO: 10.4 FL (ref 7–12)
POTASSIUM SERPL-SCNC: 4.4 MMOL/L (ref 3.5–5)
RBC # BLD: 4.87 M/UL (ref 3.5–5.5)
SODIUM BLD-SCNC: 138 MMOL/L (ref 132–146)
TOTAL PROTEIN: 7.9 G/DL (ref 6.4–8.3)
TRIGL SERPL-MCNC: 132 MG/DL
TSH SERPL DL<=0.05 MIU/L-ACNC: 1.13 UIU/ML (ref 0.27–4.2)
VITAMIN D 25-HYDROXY: 41.5 NG/ML (ref 30–100)
VLDLC SERPL CALC-MCNC: 26 MG/DL
WBC # BLD: 5.5 K/UL (ref 4.5–11.5)

## 2024-08-16 PROCEDURE — 99396 PREV VISIT EST AGE 40-64: CPT

## 2024-08-16 PROCEDURE — 36415 COLL VENOUS BLD VENIPUNCTURE: CPT

## 2024-08-16 SDOH — ECONOMIC STABILITY: FOOD INSECURITY: WITHIN THE PAST 12 MONTHS, THE FOOD YOU BOUGHT JUST DIDN'T LAST AND YOU DIDN'T HAVE MONEY TO GET MORE.: NEVER TRUE

## 2024-08-16 SDOH — ECONOMIC STABILITY: FOOD INSECURITY: WITHIN THE PAST 12 MONTHS, YOU WORRIED THAT YOUR FOOD WOULD RUN OUT BEFORE YOU GOT MONEY TO BUY MORE.: NEVER TRUE

## 2024-08-16 SDOH — ECONOMIC STABILITY: INCOME INSECURITY: HOW HARD IS IT FOR YOU TO PAY FOR THE VERY BASICS LIKE FOOD, HOUSING, MEDICAL CARE, AND HEATING?: NOT HARD AT ALL

## 2024-08-16 ASSESSMENT — ENCOUNTER SYMPTOMS
CONSTIPATION: 0
ABDOMINAL PAIN: 0
SHORTNESS OF BREATH: 0
PHOTOPHOBIA: 0
WHEEZING: 0
BACK PAIN: 0
DIARRHEA: 0
SORE THROAT: 0
VOMITING: 0
COUGH: 0
RHINORRHEA: 0

## 2024-08-16 NOTE — PROGRESS NOTES
Subjective:  54 y.o. 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  States pain has greatly improved from when it first started.    History reviewed. No pertinent past medical history.    Past Surgical History:   Procedure Laterality Date    ANKLE FRACTURE SURGERY  2006    FRACTURE SURGERY  2006    TUBAL LIGATION       Current Outpatient Medications   Medication Sig Dispense Refill    acyclovir (ZOVIRAX) 400 MG tablet Take 1 tab TID x 5 D when outbreak occurs. 30 tablet 0    Multiple Vitamins-Minerals (THERAPEUTIC MULTIVITAMIN-MINERALS) tablet Take 1 tablet by mouth daily      Cholecalciferol (VITAMIN D) 50 MCG (2000 UT) CAPS capsule Take by mouth      b complex vitamins capsule Take 1 capsule by mouth daily      vitamin C (ASCORBIC ACID) 500 MG tablet Take 1 tablet by mouth daily       No current facility-administered medications for this visit.     Health Maintenance Due   Topic Date Due    Hepatitis B vaccine (1 of 3 - 19+ 3-dose series) Never done    DTaP/Tdap/Td vaccine (1 - Tdap) Never done    Shingles vaccine (1 of 2) Never done    COVID-19 Vaccine (1 - 2023-24 season) Never done    Flu vaccine (1) 08/01/2024     Review of Systems   Constitutional:  Negative for appetite change, chills, diaphoresis, fatigue and fever.   HENT:  Negative for congestion, hearing loss, rhinorrhea and sore throat.    Eyes:  Negative for photophobia and visual disturbance.   Respiratory:  Negative for cough, shortness of breath and wheezing.    Cardiovascular:  Negative for chest pain and palpitations.   Gastrointestinal:  Negative for abdominal pain, constipation, diarrhea and vomiting.   Musculoskeletal:  Negative for

## 2024-08-23 ENCOUNTER — TELEPHONE (OUTPATIENT)
Dept: PRIMARY CARE CLINIC | Age: 54
End: 2024-08-23

## 2024-08-23 NOTE — TELEPHONE ENCOUNTER
Patient returned call to the office. Notified her of the following per Lenin Brunner PA-C:    Cholesterol up since previous.  Total 222 and .  Watch saturated fats in diet.  CMP baseline.  TSH WNL.  Vitamin D WNL.  CBC WNL.     Patient verbalized understanding.

## 2024-09-04 ENCOUNTER — OFFICE VISIT (OUTPATIENT)
Dept: OBGYN | Age: 54
End: 2024-09-04
Payer: COMMERCIAL

## 2024-09-04 VITALS
WEIGHT: 238 LBS | HEIGHT: 68 IN | DIASTOLIC BLOOD PRESSURE: 68 MMHG | BODY MASS INDEX: 36.07 KG/M2 | SYSTOLIC BLOOD PRESSURE: 135 MMHG | HEART RATE: 68 BPM

## 2024-09-04 DIAGNOSIS — N64.4 BREAST PAIN IN FEMALE: Primary | ICD-10-CM

## 2024-09-04 DIAGNOSIS — R92.30 DENSE BREAST TISSUE: ICD-10-CM

## 2024-09-04 PROCEDURE — 99213 OFFICE O/P EST LOW 20 MIN: CPT | Performed by: OBSTETRICS & GYNECOLOGY

## 2024-09-04 NOTE — PROGRESS NOTES
Selma Jones     Patient presents for evaluation of breast pain.     Patient states she has right sided breast pain. It starts near her arm pit and radiates down her breast. She also has tingling along the breast. This started over the past week and it not constant.     Patient will drink one cup of caffeinated tea a day.     Patient has a sister who had breast cancer in her fifties, living today and a maternal grandmother who passed away from breast cancer.     Patient had a normal mammogram 2024 followed by normal breast ultrasound due to dense breast tissue 2024.     No past medical history on file.     Past Surgical History:   Procedure Laterality Date    ANKLE FRACTURE SURGERY      FRACTURE SURGERY      TUBAL LIGATION          Family History   Problem Relation Age of Onset    Cancer Father 65        multiple myleoma    High Blood Pressure Father     Cancer Sister 50        breast    Cancer Sister 61        lymphoma (not sure of type)    Cancer Brother         unknown type - jaw    Lung Cancer Brother 55    Heart Attack Brother     Breast Cancer Paternal Grandmother 56         at 56    Cancer Paternal Grandmother     Cancer Paternal Aunt         unknown type    Cancer Paternal Uncle         unknown type - possible kidney    Breast Cancer Half-Sibling 50        same father    Other Other 62        non hodgkins lymphoma        Social History       Tobacco History       Smoking Status  Former Quit Date  2013 Smoking Tobacco Type  Cigarettes quit in 2013   Pack Year History     Packs/Day From To Years    0 2013  11.3      Smokeless Tobacco Use  Never              Alcohol History       Alcohol Use Status  No              Drug Use       Drug Use Status  No              Sexual Activity       Sexually Active  Yes Partners  Male Comment                        Current Outpatient Medications:     acyclovir (ZOVIRAX) 400 MG tablet, Take 1 tab TID x 5 D when outbreak occurs., Disp:

## 2024-09-04 NOTE — PROGRESS NOTES
Patient alert and pleasant with concerns about right breat pain  Discharge instructions have been discussed with the patient. Patient advised to call our office with any questions or concerns.   Voiced understanding.

## 2024-10-15 ENCOUNTER — HOSPITAL ENCOUNTER (OUTPATIENT)
Dept: GENERAL RADIOLOGY | Age: 54
Discharge: HOME | End: 2024-10-17
Payer: COMMERCIAL

## 2024-10-15 DIAGNOSIS — N64.4 BREAST PAIN: ICD-10-CM

## 2024-10-15 DIAGNOSIS — N64.4 BREAST PAIN IN FEMALE: ICD-10-CM

## 2024-10-15 PROCEDURE — G0279 TOMOSYNTHESIS, MAMMO: HCPCS

## 2024-10-15 PROCEDURE — 76642 ULTRASOUND BREAST LIMITED: CPT

## 2025-02-28 ENCOUNTER — HOSPITAL ENCOUNTER (OUTPATIENT)
Dept: GENERAL RADIOLOGY | Age: 55
Discharge: HOME | End: 2025-02-28
Payer: COMMERCIAL

## 2025-02-28 VITALS — HEIGHT: 68 IN | WEIGHT: 225 LBS | BODY MASS INDEX: 34.1 KG/M2

## 2025-02-28 DIAGNOSIS — Z12.31 ENCOUNTER FOR SCREENING MAMMOGRAM FOR MALIGNANT NEOPLASM OF BREAST: ICD-10-CM

## 2025-02-28 PROCEDURE — 77063 BREAST TOMOSYNTHESIS BI: CPT

## 2025-03-06 ENCOUNTER — CLINICAL DOCUMENTATION (OUTPATIENT)
Dept: GENERAL RADIOLOGY | Age: 55
End: 2025-03-06

## 2025-03-06 NOTE — PROGRESS NOTES
Patient viewed clinical report for mammogram on My Chart.  Report sent to Dr. Selma Adam per patient request on Authorization to Release the results of a mammogram form.

## 2025-08-22 ENCOUNTER — OFFICE VISIT (OUTPATIENT)
Dept: PRIMARY CARE CLINIC | Age: 55
End: 2025-08-22
Payer: COMMERCIAL

## 2025-08-22 VITALS
BODY MASS INDEX: 34.71 KG/M2 | HEIGHT: 68 IN | WEIGHT: 229 LBS | SYSTOLIC BLOOD PRESSURE: 134 MMHG | OXYGEN SATURATION: 99 % | HEART RATE: 62 BPM | TEMPERATURE: 97.4 F | DIASTOLIC BLOOD PRESSURE: 86 MMHG

## 2025-08-22 DIAGNOSIS — Z00.00 WELL ADULT EXAM: Primary | ICD-10-CM

## 2025-08-22 DIAGNOSIS — Z13.1 SCREENING FOR DIABETES MELLITUS: ICD-10-CM

## 2025-08-22 DIAGNOSIS — B00.1 COLD SORE: ICD-10-CM

## 2025-08-22 DIAGNOSIS — N63.20 MASS OF LEFT BREAST, UNSPECIFIED QUADRANT: ICD-10-CM

## 2025-08-22 DIAGNOSIS — E78.5 DYSLIPIDEMIA: ICD-10-CM

## 2025-08-22 LAB
ALBUMIN: 4.8 G/DL (ref 3.5–5.2)
ALP BLD-CCNC: 124 U/L (ref 35–104)
ALT SERPL-CCNC: 32 U/L (ref 0–35)
ANION GAP SERPL CALCULATED.3IONS-SCNC: 13 MMOL/L (ref 7–16)
AST SERPL-CCNC: 31 U/L (ref 0–35)
BASOPHILS ABSOLUTE: 0.06 K/UL (ref 0–0.2)
BASOPHILS RELATIVE PERCENT: 1 % (ref 0–2)
BILIRUB SERPL-MCNC: 0.8 MG/DL (ref 0–1.2)
BUN BLDV-MCNC: 16 MG/DL (ref 6–20)
CALCIUM SERPL-MCNC: 10.4 MG/DL (ref 8.6–10)
CHLORIDE BLD-SCNC: 103 MMOL/L (ref 98–107)
CHOLESTEROL, TOTAL: 236 MG/DL
CO2: 23 MMOL/L (ref 22–29)
CREAT SERPL-MCNC: 0.8 MG/DL (ref 0.5–1)
EOSINOPHILS ABSOLUTE: 0.16 K/UL (ref 0.05–0.5)
EOSINOPHILS RELATIVE PERCENT: 3 % (ref 0–6)
GFR, ESTIMATED: 88 ML/MIN/1.73M2
GLUCOSE BLD-MCNC: 101 MG/DL (ref 74–99)
HBA1C MFR BLD: 5.6 %
HCT VFR BLD CALC: 43.2 % (ref 34–48)
HDLC SERPL-MCNC: 66 MG/DL
HEMOGLOBIN: 14.2 G/DL (ref 11.5–15.5)
IMMATURE GRANULOCYTES %: 0 % (ref 0–5)
IMMATURE GRANULOCYTES ABSOLUTE: <0.03 K/UL (ref 0–0.58)
LDL CHOLESTEROL: 138 MG/DL
LYMPHOCYTES ABSOLUTE: 2.29 K/UL (ref 1.5–4)
LYMPHOCYTES RELATIVE PERCENT: 39 % (ref 20–42)
MCH RBC QN AUTO: 29.3 PG (ref 26–35)
MCHC RBC AUTO-ENTMCNC: 32.9 G/DL (ref 32–34.5)
MCV RBC AUTO: 89.3 FL (ref 80–99.9)
MONOCYTES ABSOLUTE: 0.46 K/UL (ref 0.1–0.95)
MONOCYTES RELATIVE PERCENT: 8 % (ref 2–12)
NEUTROPHILS ABSOLUTE: 2.89 K/UL (ref 1.8–7.3)
NEUTROPHILS RELATIVE PERCENT: 49 % (ref 43–80)
PDW BLD-RTO: 13.1 % (ref 11.5–15)
PLATELET # BLD: 327 K/UL (ref 130–450)
PMV BLD AUTO: 10.7 FL (ref 7–12)
POTASSIUM SERPL-SCNC: 4.3 MMOL/L (ref 3.5–5.1)
RBC # BLD: 4.84 M/UL (ref 3.5–5.5)
SODIUM BLD-SCNC: 139 MMOL/L (ref 136–145)
TOTAL PROTEIN: 8.1 G/DL (ref 6.4–8.3)
TRIGL SERPL-MCNC: 157 MG/DL
TSH SERPL DL<=0.05 MIU/L-ACNC: 0.93 UIU/ML (ref 0.27–4.2)
VLDLC SERPL CALC-MCNC: 31 MG/DL
WBC # BLD: 5.9 K/UL (ref 4.5–11.5)

## 2025-08-22 PROCEDURE — 99396 PREV VISIT EST AGE 40-64: CPT

## 2025-08-22 PROCEDURE — 83036 HEMOGLOBIN GLYCOSYLATED A1C: CPT

## 2025-08-22 PROCEDURE — 36415 COLL VENOUS BLD VENIPUNCTURE: CPT

## 2025-08-22 SDOH — ECONOMIC STABILITY: FOOD INSECURITY: WITHIN THE PAST 12 MONTHS, YOU WORRIED THAT YOUR FOOD WOULD RUN OUT BEFORE YOU GOT MONEY TO BUY MORE.: NEVER TRUE

## 2025-08-22 SDOH — ECONOMIC STABILITY: FOOD INSECURITY: WITHIN THE PAST 12 MONTHS, THE FOOD YOU BOUGHT JUST DIDN'T LAST AND YOU DIDN'T HAVE MONEY TO GET MORE.: NEVER TRUE

## 2025-08-22 ASSESSMENT — PATIENT HEALTH QUESTIONNAIRE - PHQ9
SUM OF ALL RESPONSES TO PHQ QUESTIONS 1-9: 0
2. FEELING DOWN, DEPRESSED OR HOPELESS: NOT AT ALL
1. LITTLE INTEREST OR PLEASURE IN DOING THINGS: NOT AT ALL
SUM OF ALL RESPONSES TO PHQ QUESTIONS 1-9: 0

## 2025-08-26 ENCOUNTER — TELEPHONE (OUTPATIENT)
Dept: PRIMARY CARE CLINIC | Age: 55
End: 2025-08-26

## 2025-08-26 DIAGNOSIS — R22.32 MASS OF LEFT AXILLA: Primary | ICD-10-CM

## 2025-08-28 ENCOUNTER — PATIENT MESSAGE (OUTPATIENT)
Dept: PRIMARY CARE CLINIC | Age: 55
End: 2025-08-28

## 2025-08-29 DIAGNOSIS — E55.9 VITAMIN D DEFICIENCY: ICD-10-CM

## 2025-08-29 DIAGNOSIS — E83.52 SERUM CALCIUM ELEVATED: ICD-10-CM

## 2025-08-29 LAB
PTH INTACT: 53 PG/ML (ref 15–65)
VITAMIN D 25-HYDROXY: 46 NG/ML (ref 30–100)